# Patient Record
Sex: MALE | Race: BLACK OR AFRICAN AMERICAN
[De-identification: names, ages, dates, MRNs, and addresses within clinical notes are randomized per-mention and may not be internally consistent; named-entity substitution may affect disease eponyms.]

---

## 2021-08-02 PROBLEM — Z00.00 ENCOUNTER FOR PREVENTIVE HEALTH EXAMINATION: Status: ACTIVE | Noted: 2021-08-02

## 2021-08-03 ENCOUNTER — RESULT CHARGE (OUTPATIENT)
Age: 41
End: 2021-08-03

## 2021-08-04 ENCOUNTER — NON-APPOINTMENT (OUTPATIENT)
Age: 41
End: 2021-08-04

## 2021-08-04 ENCOUNTER — APPOINTMENT (OUTPATIENT)
Dept: PULMONOLOGY | Facility: CLINIC | Age: 41
End: 2021-08-04
Payer: COMMERCIAL

## 2021-08-04 VITALS
TEMPERATURE: 98 F | OXYGEN SATURATION: 97 % | DIASTOLIC BLOOD PRESSURE: 83 MMHG | HEIGHT: 72 IN | WEIGHT: 263 LBS | BODY MASS INDEX: 35.62 KG/M2 | HEART RATE: 83 BPM | SYSTOLIC BLOOD PRESSURE: 134 MMHG

## 2021-08-04 DIAGNOSIS — Z78.9 OTHER SPECIFIED HEALTH STATUS: ICD-10-CM

## 2021-08-04 DIAGNOSIS — R93.89 ABNORMAL FINDINGS ON DIAGNOSTIC IMAGING OF OTHER SPECIFIED BODY STRUCTURES: ICD-10-CM

## 2021-08-04 DIAGNOSIS — R06.02 SHORTNESS OF BREATH: ICD-10-CM

## 2021-08-04 DIAGNOSIS — R06.83 SNORING: ICD-10-CM

## 2021-08-04 PROCEDURE — ZZZZZ: CPT

## 2021-08-04 PROCEDURE — 94727 GAS DIL/WSHOT DETER LNG VOL: CPT

## 2021-08-04 PROCEDURE — 99244 OFF/OP CNSLTJ NEW/EST MOD 40: CPT | Mod: 25

## 2021-08-04 PROCEDURE — 94729 DIFFUSING CAPACITY: CPT

## 2021-08-04 PROCEDURE — 88738 HGB QUANT TRANSCUTANEOUS: CPT

## 2021-08-04 PROCEDURE — 94010 BREATHING CAPACITY TEST: CPT

## 2021-08-04 RX ORDER — MONTELUKAST SODIUM 10 MG/1
TABLET, FILM COATED ORAL
Refills: 0 | Status: ACTIVE | COMMUNITY

## 2021-08-04 RX ORDER — CHLORTHALIDONE 50 MG/1
TABLET ORAL
Refills: 0 | Status: ACTIVE | COMMUNITY

## 2021-08-04 NOTE — CONSULT LETTER
[FreeTextEntry1] : Dear ,\par \par I had the pleasure of evaluating your patient, BIBIANA WAN today in pulmonary consultation.  Please refer to my attached note for my findings and recommendations.\par \par \par Thank you for allowing me to participate in the care of your patient, please feel free to call with any questions or concerns.\par \par \par Sincerely,\par \par Winnie Eubanks MD\par [facility] \par [facility]\par \par

## 2021-08-04 NOTE — PROCEDURE
[FreeTextEntry1] : CXR from [facility] 6/25/21 reviewed--5mm LLL nodule likely granuloma.\par \par PFT: Mild obstructive dysfunction.  Lung volumes normal. DLCO normal.\par

## 2021-08-04 NOTE — ASSESSMENT
[FreeTextEntry1] : eval for NISHI with HSS\par \par episode of sob during day has no occurred in last 1 month, on singulair daily and prn albuterol, would cont for now, if symptoms worsen can consider addition of inhaled steroid.\par \par repeat cxr 1 year--likely granuloma

## 2021-08-04 NOTE — HISTORY OF PRESENT ILLNESS
[Never] : never [TextBox_4] : 41M with HTN, seasonal allergies\par \par episode 1 month ago while sitting in car and felt suddenly short of breath, lasted for 30 min and subsided on its own.  Does not think was anxiety.  Subsequently began having sob at night that would wake him from sleep, need to get up and walk around.  +snoring, no witnessed apneas. Denies sob with activities.  Was given albuterol inhaler which he used once and seemed to help.\par \par had BCG vaccine, has stable 5 mm nodule on cxr, no known hx of TB\par works as